# Patient Record
Sex: MALE | Race: BLACK OR AFRICAN AMERICAN | URBAN - METROPOLITAN AREA
[De-identification: names, ages, dates, MRNs, and addresses within clinical notes are randomized per-mention and may not be internally consistent; named-entity substitution may affect disease eponyms.]

---

## 2018-01-03 LAB
COLOR STL: NORMAL
CONSISTENCY STL: NORMAL
HEMOCCULT SP1 STL QL: NEGATIVE

## 2018-01-04 ENCOUNTER — HISTORICAL (OUTPATIENT)
Dept: INTERNAL MEDICINE | Facility: CLINIC | Age: 63
End: 2018-01-04

## 2018-03-30 ENCOUNTER — HISTORICAL (OUTPATIENT)
Dept: INTERNAL MEDICINE | Facility: CLINIC | Age: 63
End: 2018-03-30

## 2018-03-30 LAB
COLOR STL: NORMAL
CONSISTENCY STL: NORMAL
HEMOCCULT SP2 STL QL: NEGATIVE

## 2018-04-01 ENCOUNTER — HOSPITAL ENCOUNTER (OUTPATIENT)
Dept: INTENSIVE CARE | Facility: HOSPITAL | Age: 63
End: 2018-04-02
Attending: INTERNAL MEDICINE | Admitting: SURGERY

## 2018-04-01 LAB
ABS NEUT (OLG): 1.96 X10(3)/MCL (ref 2.1–9.2)
ALBUMIN SERPL-MCNC: 3.4 GM/DL (ref 3.4–5)
ALBUMIN/GLOB SERPL: 1 RATIO (ref 1–2)
ALP SERPL-CCNC: 82 UNIT/L (ref 45–117)
ALT SERPL-CCNC: 53 UNIT/L (ref 12–78)
AST SERPL-CCNC: 38 UNIT/L (ref 15–37)
BASOPHILS NFR BLD MANUAL: 2 %
BILIRUB SERPL-MCNC: 0.3 MG/DL (ref 0.2–1)
BILIRUBIN DIRECT+TOT PNL SERPL-MCNC: 0.1 MG/DL
BILIRUBIN DIRECT+TOT PNL SERPL-MCNC: 0.2 MG/DL
BUN SERPL-MCNC: 14 MG/DL (ref 7–18)
BUN SERPL-MCNC: 19 MG/DL (ref 7–18)
CALCIUM SERPL-MCNC: 8.7 MG/DL (ref 8.5–10.1)
CALCIUM SERPL-MCNC: 8.9 MG/DL (ref 8.5–10.1)
CHLORIDE SERPL-SCNC: 101 MMOL/L (ref 98–107)
CHLORIDE SERPL-SCNC: 103 MMOL/L (ref 98–107)
CO2 SERPL-SCNC: 27 MMOL/L (ref 21–32)
CO2 SERPL-SCNC: 28 MMOL/L (ref 21–32)
CREAT SERPL-MCNC: 1 MG/DL (ref 0.6–1.3)
CREAT SERPL-MCNC: 1.1 MG/DL (ref 0.6–1.3)
CREAT/UREA NIT SERPL: 13
EOSINOPHIL NFR BLD MANUAL: 3 %
ERYTHROCYTE [DISTWIDTH] IN BLOOD BY AUTOMATED COUNT: 12.2 % (ref 11.5–14.5)
EST. AVERAGE GLUCOSE BLD GHB EST-MCNC: 100 MG/DL
GLOBULIN SER-MCNC: 4.7 GM/ML (ref 2.3–3.5)
GLUCOSE SERPL-MCNC: 107 MG/DL (ref 74–106)
GLUCOSE SERPL-MCNC: 113 MG/DL (ref 74–106)
GRANULOCYTES NFR BLD MANUAL: 40 % (ref 43–75)
HBA1C MFR BLD: 5.1 % (ref 4.2–6.3)
HCT VFR BLD AUTO: 40.8 % (ref 40–51)
HGB BLD-MCNC: 13 GM/DL (ref 13.5–17.5)
LYMPHOCYTES NFR BLD MANUAL: 1 %
LYMPHOCYTES NFR BLD MANUAL: 43 % (ref 20.5–51.1)
MCH RBC QN AUTO: 28.4 PG (ref 26–34)
MCHC RBC AUTO-ENTMCNC: 31.9 GM/DL (ref 31–37)
MCV RBC AUTO: 89.3 FL (ref 80–100)
MONOCYTES NFR BLD MANUAL: 9 % (ref 2–9)
NEUTS BAND NFR BLD MANUAL: 2 % (ref 0–10)
PLATELET # BLD AUTO: 280 X10(3)/MCL (ref 130–400)
PLATELET # BLD EST: ADEQUATE 10*3/UL
PMV BLD AUTO: 9.6 FL (ref 7.4–10.4)
POC TROPONIN: 0.01 NG/ML (ref 0–0.08)
POTASSIUM SERPL-SCNC: 3.9 MMOL/L (ref 3.5–5.1)
POTASSIUM SERPL-SCNC: 4 MMOL/L (ref 3.5–5.1)
PROT SERPL-MCNC: 8.1 GM/DL (ref 6.4–8.2)
RBC # BLD AUTO: 4.57 X10(6)/MCL (ref 4.5–5.9)
RBC MORPH BLD: NORMAL
SODIUM SERPL-SCNC: 138 MMOL/L (ref 136–145)
SODIUM SERPL-SCNC: 138 MMOL/L (ref 136–145)
T4 FREE SERPL-MCNC: 0.91 NG/DL (ref 0.76–1.46)
TROPONIN I SERPL-MCNC: <0.015 NG/ML (ref 0–0.05)
TSH SERPL-ACNC: 1.48 MIU/L (ref 0.36–3.74)
WBC # SPEC AUTO: 5.9 X10(3)/MCL (ref 4.5–11)

## 2018-04-02 LAB
AMPHET UR QL SCN: NEGATIVE
APPEARANCE, UA: CLEAR
BACTERIA #/AREA URNS AUTO: ABNORMAL /[HPF]
BARBITURATE SCN PRESENT UR: NEGATIVE
BENZODIAZ UR QL SCN: NEGATIVE
BILIRUB UR QL STRIP: NEGATIVE
CANNABINOIDS UR QL SCN: NEGATIVE
CHOLEST SERPL-MCNC: 194 MG/DL
CHOLEST/HDLC SERPL: 4.5 {RATIO} (ref 0–5)
COCAINE UR QL SCN: NEGATIVE
COLOR UR: COLORLESS
GLUCOSE (UA): NORMAL
HDLC SERPL-MCNC: 43 MG/DL
HGB UR QL STRIP: NEGATIVE
HYALINE CASTS #/AREA URNS LPF: ABNORMAL /[LPF]
KETONES UR QL STRIP: NEGATIVE
LDLC SERPL CALC-MCNC: 119 MG/DL (ref 0–130)
LEUKOCYTE ESTERASE UR QL STRIP: NEGATIVE
NITRITE UR QL STRIP: NEGATIVE
OPIATES UR QL SCN: NEGATIVE
PCP UR QL: NEGATIVE
PH UR STRIP.AUTO: 6.5 [PH] (ref 5–8)
PH UR STRIP: 6.5 [PH] (ref 4.5–8)
PROT UR QL STRIP: NEGATIVE
RBC #/AREA URNS AUTO: ABNORMAL /[HPF]
SP GR UR STRIP: 1 (ref 1–1.03)
SQUAMOUS #/AREA URNS LPF: <1 /[LPF]
TEMPERATURE, URINE (OHS): 24 DEGC (ref 20–25)
TRIGL SERPL-MCNC: 158 MG/DL
UROBILINOGEN UR STRIP-ACNC: NORMAL
VLDLC SERPL CALC-MCNC: 32 MG/DL
WBC #/AREA URNS AUTO: ABNORMAL /HPF

## 2018-06-11 ENCOUNTER — HISTORICAL (OUTPATIENT)
Dept: INTERNAL MEDICINE | Facility: CLINIC | Age: 63
End: 2018-06-11

## 2018-06-11 LAB
ABS NEUT (OLG): 2.38 X10(3)/MCL (ref 2.1–9.2)
ALBUMIN SERPL-MCNC: 3.8 GM/DL (ref 3.4–5)
ALBUMIN/GLOB SERPL: 1 RATIO (ref 1–2)
ALP SERPL-CCNC: 64 UNIT/L (ref 45–117)
ALT SERPL-CCNC: 55 UNIT/L (ref 12–78)
APPEARANCE, UA: CLEAR
AST SERPL-CCNC: 37 UNIT/L (ref 15–37)
BACTERIA #/AREA URNS AUTO: ABNORMAL /[HPF]
BASOPHILS # BLD AUTO: 0.1 X10(3)/MCL
BASOPHILS NFR BLD AUTO: 2 %
BILIRUB SERPL-MCNC: 0.6 MG/DL (ref 0.2–1)
BILIRUB UR QL STRIP: NEGATIVE
BILIRUBIN DIRECT+TOT PNL SERPL-MCNC: 0.3 MG/DL
BILIRUBIN DIRECT+TOT PNL SERPL-MCNC: 0.3 MG/DL
BUN SERPL-MCNC: 22 MG/DL (ref 7–18)
CALCIUM SERPL-MCNC: 8.5 MG/DL (ref 8.5–10.1)
CHLORIDE SERPL-SCNC: 103 MMOL/L (ref 98–107)
CHOLEST SERPL-MCNC: 198 MG/DL
CHOLEST/HDLC SERPL: 4.4 {RATIO} (ref 0–5)
CO2 SERPL-SCNC: 28 MMOL/L (ref 21–32)
COLOR UR: YELLOW
CREAT SERPL-MCNC: 1.2 MG/DL (ref 0.6–1.3)
EOSINOPHIL # BLD AUTO: 0.13 X10(3)/MCL
EOSINOPHIL NFR BLD AUTO: 2 %
ERYTHROCYTE [DISTWIDTH] IN BLOOD BY AUTOMATED COUNT: 12.5 % (ref 11.5–14.5)
GLOBULIN SER-MCNC: 4.6 GM/ML (ref 2.3–3.5)
GLUCOSE (UA): NORMAL
GLUCOSE SERPL-MCNC: 91 MG/DL (ref 74–106)
HCT VFR BLD AUTO: 38.5 % (ref 40–51)
HDLC SERPL-MCNC: 45 MG/DL
HGB BLD-MCNC: 12.4 GM/DL (ref 13.5–17.5)
HGB UR QL STRIP: NEGATIVE
HYALINE CASTS #/AREA URNS LPF: ABNORMAL /[LPF]
IMM GRANULOCYTES # BLD AUTO: 0.01 10*3/UL
IMM GRANULOCYTES NFR BLD AUTO: 0 %
KETONES UR QL STRIP: NEGATIVE
LDLC SERPL CALC-MCNC: 105 MG/DL (ref 0–130)
LEUKOCYTE ESTERASE UR QL STRIP: NEGATIVE
LYMPHOCYTES # BLD AUTO: 2.48 X10(3)/MCL
LYMPHOCYTES NFR BLD AUTO: 44 % (ref 13–40)
MCH RBC QN AUTO: 28.8 PG (ref 26–34)
MCHC RBC AUTO-ENTMCNC: 32.2 GM/DL (ref 31–37)
MCV RBC AUTO: 89.3 FL (ref 80–100)
MONOCYTES # BLD AUTO: 0.58 X10(3)/MCL
MONOCYTES NFR BLD AUTO: 10 % (ref 4–12)
NEUTROPHILS # BLD AUTO: 2.38 X10(3)/MCL
NEUTROPHILS NFR BLD AUTO: 42 X10(3)/MCL
NITRITE UR QL STRIP: NEGATIVE
PH UR STRIP: 5.5 [PH] (ref 4.5–8)
PLATELET # BLD AUTO: 313 X10(3)/MCL (ref 130–400)
PMV BLD AUTO: 9.4 FL (ref 7.4–10.4)
POTASSIUM SERPL-SCNC: 3.8 MMOL/L (ref 3.5–5.1)
PROT SERPL-MCNC: 8.4 GM/DL (ref 6.4–8.2)
PROT UR QL STRIP: NEGATIVE
RBC # BLD AUTO: 4.31 X10(6)/MCL (ref 4.5–5.9)
RBC #/AREA URNS AUTO: ABNORMAL /[HPF]
SODIUM SERPL-SCNC: 139 MMOL/L (ref 136–145)
SP GR UR STRIP: 1.01 (ref 1–1.03)
SQUAMOUS #/AREA URNS LPF: ABNORMAL /[LPF]
TRIGL SERPL-MCNC: 238 MG/DL
UROBILINOGEN UR STRIP-ACNC: 4 MG/DL
VLDLC SERPL CALC-MCNC: 48 MG/DL
WBC # SPEC AUTO: 5.7 X10(3)/MCL (ref 4.5–11)
WBC #/AREA URNS AUTO: ABNORMAL /HPF

## 2019-12-20 ENCOUNTER — HISTORICAL (OUTPATIENT)
Dept: INTERNAL MEDICINE | Facility: CLINIC | Age: 64
End: 2019-12-20

## 2020-07-28 ENCOUNTER — HISTORICAL (OUTPATIENT)
Dept: ADMINISTRATIVE | Facility: HOSPITAL | Age: 65
End: 2020-07-28

## 2020-07-28 LAB
ABS NEUT (OLG): 1.97 X10(3)/MCL (ref 2.1–9.2)
ALBUMIN SERPL-MCNC: 3.7 GM/DL (ref 3.4–5)
ALBUMIN/GLOB SERPL: 0.8 RATIO (ref 1.1–2)
ALP SERPL-CCNC: 70 UNIT/L (ref 45–117)
ALT SERPL-CCNC: 74 UNIT/L (ref 12–78)
AST SERPL-CCNC: 51 UNIT/L (ref 15–37)
BASOPHILS # BLD AUTO: 0.1 X10(3)/MCL (ref 0–0.2)
BASOPHILS NFR BLD AUTO: 2 %
BILIRUB SERPL-MCNC: 0.6 MG/DL (ref 0.2–1)
BILIRUBIN DIRECT+TOT PNL SERPL-MCNC: 0.3 MG/DL
BILIRUBIN DIRECT+TOT PNL SERPL-MCNC: 0.3 MG/DL (ref 0–0.2)
BUN SERPL-MCNC: 24 MG/DL (ref 7–18)
CALCIUM SERPL-MCNC: 9.2 MG/DL (ref 8.5–10.1)
CHLORIDE SERPL-SCNC: 107 MMOL/L (ref 98–107)
CO2 SERPL-SCNC: 28 MMOL/L (ref 21–32)
CREAT SERPL-MCNC: 1.1 MG/DL (ref 0.6–1.3)
EOSINOPHIL # BLD AUTO: 0.1 X10(3)/MCL (ref 0–0.9)
EOSINOPHIL NFR BLD AUTO: 2 %
ERYTHROCYTE [DISTWIDTH] IN BLOOD BY AUTOMATED COUNT: 12.4 % (ref 11.5–14.5)
EST. AVERAGE GLUCOSE BLD GHB EST-MCNC: 111 MG/DL
GLOBULIN SER-MCNC: 4.9 GM/ML (ref 2.3–3.5)
GLUCOSE SERPL-MCNC: 90 MG/DL (ref 74–106)
HBA1C MFR BLD: 5.5 % (ref 4.2–6.3)
HCT VFR BLD AUTO: 40.6 % (ref 40–51)
HGB BLD-MCNC: 12.7 GM/DL (ref 13.5–17.5)
IMM GRANULOCYTES # BLD AUTO: 0.01 10*3/UL
IMM GRANULOCYTES NFR BLD AUTO: 0 %
LYMPHOCYTES # BLD AUTO: 2.9 X10(3)/MCL (ref 0.6–4.6)
LYMPHOCYTES NFR BLD AUTO: 50 %
MCH RBC QN AUTO: 29 PG (ref 26–34)
MCHC RBC AUTO-ENTMCNC: 31.3 GM/DL (ref 31–37)
MCV RBC AUTO: 92.7 FL (ref 80–100)
MONOCYTES # BLD AUTO: 0.6 X10(3)/MCL (ref 0.1–1.3)
MONOCYTES NFR BLD AUTO: 11 %
NEUTROPHILS # BLD AUTO: 1.97 X10(3)/MCL (ref 2.1–9.2)
NEUTROPHILS NFR BLD AUTO: 34 %
PLATELET # BLD AUTO: 282 X10(3)/MCL (ref 130–400)
PMV BLD AUTO: 9.4 FL (ref 7.4–10.4)
POTASSIUM SERPL-SCNC: 5 MMOL/L (ref 3.5–5.1)
PROT SERPL-MCNC: 8.6 GM/DL (ref 6.4–8.2)
RBC # BLD AUTO: 4.38 X10(6)/MCL (ref 4.5–5.9)
SODIUM SERPL-SCNC: 140 MMOL/L (ref 136–145)
URATE SERPL-MCNC: 9.3 MG/DL (ref 3.5–7.2)
WBC # SPEC AUTO: 5.7 X10(3)/MCL (ref 4.5–11)

## 2021-03-10 ENCOUNTER — HISTORICAL (OUTPATIENT)
Dept: ADMINISTRATIVE | Facility: HOSPITAL | Age: 66
End: 2021-03-10

## 2021-04-26 ENCOUNTER — HISTORICAL (OUTPATIENT)
Dept: RESPIRATORY THERAPY | Facility: HOSPITAL | Age: 66
End: 2021-04-26

## 2021-08-23 ENCOUNTER — HISTORICAL (OUTPATIENT)
Dept: ADMINISTRATIVE | Facility: HOSPITAL | Age: 66
End: 2021-08-23

## 2021-08-23 LAB
FERRITIN SERPL-MCNC: 273.82 NG/ML (ref 21.81–274.66)
HAPTOGLOB SERPL-MCNC: 334 MG/DL (ref 40–368)
IRON SATN MFR SERPL: 16 % (ref 20–50)
IRON SERPL-MCNC: 42 UG/DL (ref 65–175)
LDH SERPL-CCNC: 435 UNIT/L (ref 140–271)
RET# (OHS): 0.11 X10(6)/MCL (ref 0.02–0.09)
RETICULOCYTE COUNT AUTOMATED (OLG): 2.6 % (ref 0.5–1.5)
TIBC SERPL-MCNC: 223 UG/DL (ref 69–240)
TIBC SERPL-MCNC: 265 UG/DL (ref 250–450)
TRANSFERRIN SERPL-MCNC: 237 MG/DL (ref 163–344)
VIT B12 SERPL-MCNC: 573 PG/ML (ref 213–816)

## 2021-09-13 ENCOUNTER — HISTORICAL (OUTPATIENT)
Dept: RADIOLOGY | Facility: HOSPITAL | Age: 66
End: 2021-09-13

## 2021-09-17 ENCOUNTER — HISTORICAL (OUTPATIENT)
Dept: ADMINISTRATIVE | Facility: HOSPITAL | Age: 66
End: 2021-09-17

## 2021-09-17 LAB
ABS NEUT (OLG): 4.29 X10(3)/MCL (ref 2.1–9.2)
ALBUMIN SERPL-MCNC: 2.8 GM/DL (ref 3.4–4.8)
ALBUMIN/GLOB SERPL: 0.5 RATIO (ref 1.1–2)
ALP SERPL-CCNC: 71 UNIT/L (ref 40–150)
ALT SERPL-CCNC: 19 UNIT/L (ref 0–55)
AST SERPL-CCNC: 19 UNIT/L (ref 5–34)
BASOPHILS # BLD AUTO: 0.1 X10(3)/MCL (ref 0–0.2)
BASOPHILS NFR BLD AUTO: 1 %
BILIRUB SERPL-MCNC: 0.3 MG/DL
BILIRUBIN DIRECT+TOT PNL SERPL-MCNC: 0.1 MG/DL (ref 0–0.8)
BILIRUBIN DIRECT+TOT PNL SERPL-MCNC: 0.2 MG/DL (ref 0–0.5)
BUN SERPL-MCNC: 12.8 MG/DL (ref 8.4–25.7)
CALCIUM SERPL-MCNC: 10.4 MG/DL (ref 8.8–10)
CHLORIDE SERPL-SCNC: 100 MMOL/L (ref 98–107)
CO2 SERPL-SCNC: 26 MMOL/L (ref 23–31)
CREAT SERPL-MCNC: 0.79 MG/DL (ref 0.73–1.18)
EOSINOPHIL # BLD AUTO: 0.3 X10(3)/MCL (ref 0–0.9)
EOSINOPHIL NFR BLD AUTO: 4 %
ERYTHROCYTE [DISTWIDTH] IN BLOOD BY AUTOMATED COUNT: 14.8 % (ref 11.5–14.5)
GLOBULIN SER-MCNC: 5.4 GM/DL (ref 2.4–3.5)
GLUCOSE SERPL-MCNC: 74 MG/DL (ref 82–115)
HCT VFR BLD AUTO: 33.7 % (ref 40–51)
HGB BLD-MCNC: 10.6 GM/DL (ref 13.5–17.5)
IMM GRANULOCYTES # BLD AUTO: 0.02 10*3/UL
IMM GRANULOCYTES NFR BLD AUTO: 0 %
LYMPHOCYTES # BLD AUTO: 1.6 X10(3)/MCL (ref 0.6–4.6)
LYMPHOCYTES NFR BLD AUTO: 23 %
MCH RBC QN AUTO: 26 PG (ref 26–34)
MCHC RBC AUTO-ENTMCNC: 31.5 GM/DL (ref 31–37)
MCV RBC AUTO: 82.8 FL (ref 80–100)
MONOCYTES # BLD AUTO: 0.8 X10(3)/MCL (ref 0.1–1.3)
MONOCYTES NFR BLD AUTO: 11 %
NEUTROPHILS # BLD AUTO: 4.29 X10(3)/MCL (ref 2.1–9.2)
NEUTROPHILS NFR BLD AUTO: 61 %
NRBC BLD AUTO-RTO: 0 % (ref 0–0.2)
PLATELET # BLD AUTO: 493 X10(3)/MCL (ref 130–400)
PMV BLD AUTO: 8.5 FL (ref 7.4–10.4)
POTASSIUM SERPL-SCNC: 3.8 MMOL/L (ref 3.5–5.1)
PROT SERPL-MCNC: 8.2 GM/DL (ref 5.8–7.6)
RBC # BLD AUTO: 4.07 X10(6)/MCL (ref 4.5–5.9)
SODIUM SERPL-SCNC: 135 MMOL/L (ref 136–145)
WBC # SPEC AUTO: 7.1 X10(3)/MCL (ref 4.5–11)

## 2021-09-22 ENCOUNTER — HISTORICAL (OUTPATIENT)
Dept: ENDOSCOPY | Facility: HOSPITAL | Age: 66
End: 2021-09-22

## 2021-09-22 LAB — SARS-COV-2 AG RESP QL IA.RAPID: NEGATIVE

## 2021-09-27 ENCOUNTER — HISTORICAL (OUTPATIENT)
Dept: INFUSION THERAPY | Facility: HOSPITAL | Age: 66
End: 2021-09-27

## 2021-09-27 LAB
ABS NEUT (OLG): 4.35 X10(3)/MCL (ref 2.1–9.2)
ALBUMIN SERPL-MCNC: 2.6 GM/DL (ref 3.4–4.8)
ALBUMIN/GLOB SERPL: 0.5 RATIO (ref 1.1–2)
ALP SERPL-CCNC: 69 UNIT/L (ref 40–150)
ALT SERPL-CCNC: 15 UNIT/L (ref 0–55)
AST SERPL-CCNC: 17 UNIT/L (ref 5–34)
BASOPHILS # BLD AUTO: 0.1 X10(3)/MCL (ref 0–0.2)
BASOPHILS NFR BLD AUTO: 1 %
BILIRUB SERPL-MCNC: 0.4 MG/DL
BILIRUBIN DIRECT+TOT PNL SERPL-MCNC: 0.2 MG/DL (ref 0–0.5)
BILIRUBIN DIRECT+TOT PNL SERPL-MCNC: 0.2 MG/DL (ref 0–0.8)
BUN SERPL-MCNC: 14.6 MG/DL (ref 8.4–25.7)
CALCIUM SERPL-MCNC: 9.4 MG/DL (ref 8.8–10)
CHLORIDE SERPL-SCNC: 100 MMOL/L (ref 98–107)
CO2 SERPL-SCNC: 25 MMOL/L (ref 23–31)
CREAT SERPL-MCNC: 0.74 MG/DL (ref 0.73–1.18)
EOSINOPHIL # BLD AUTO: 0.2 X10(3)/MCL (ref 0–0.9)
EOSINOPHIL NFR BLD AUTO: 3 %
ERYTHROCYTE [DISTWIDTH] IN BLOOD BY AUTOMATED COUNT: 14.7 % (ref 11.5–14.5)
GLOBULIN SER-MCNC: 5.4 GM/DL (ref 2.4–3.5)
GLUCOSE SERPL-MCNC: 149 MG/DL (ref 82–115)
HCT VFR BLD AUTO: 34.4 % (ref 40–51)
HGB BLD-MCNC: 10.4 GM/DL (ref 13.5–17.5)
IMM GRANULOCYTES # BLD AUTO: 0.01 10*3/UL
IMM GRANULOCYTES NFR BLD AUTO: 0 %
LYMPHOCYTES # BLD AUTO: 1.1 X10(3)/MCL (ref 0.6–4.6)
LYMPHOCYTES NFR BLD AUTO: 17 %
MAGNESIUM SERPL-MCNC: 1.7 MG/DL (ref 1.6–2.6)
MCH RBC QN AUTO: 25.2 PG (ref 26–34)
MCHC RBC AUTO-ENTMCNC: 30.2 GM/DL (ref 31–37)
MCV RBC AUTO: 83.3 FL (ref 80–100)
MONOCYTES # BLD AUTO: 0.7 X10(3)/MCL (ref 0.1–1.3)
MONOCYTES NFR BLD AUTO: 11 %
NEUTROPHILS # BLD AUTO: 4.35 X10(3)/MCL (ref 2.1–9.2)
NEUTROPHILS NFR BLD AUTO: 67 %
NRBC BLD AUTO-RTO: 0 % (ref 0–0.2)
PLATELET # BLD AUTO: 508 X10(3)/MCL (ref 130–400)
PMV BLD AUTO: 8.3 FL (ref 7.4–10.4)
POTASSIUM SERPL-SCNC: 3.4 MMOL/L (ref 3.5–5.1)
PROT SERPL-MCNC: 8 GM/DL (ref 5.8–7.6)
RBC # BLD AUTO: 4.13 X10(6)/MCL (ref 4.5–5.9)
SODIUM SERPL-SCNC: 136 MMOL/L (ref 136–145)
WBC # SPEC AUTO: 6.5 X10(3)/MCL (ref 4.5–11)

## 2021-09-29 ENCOUNTER — HISTORICAL (OUTPATIENT)
Dept: INFUSION THERAPY | Facility: HOSPITAL | Age: 66
End: 2021-09-29

## 2021-09-30 ENCOUNTER — HISTORICAL (OUTPATIENT)
Dept: RADIOLOGY | Facility: HOSPITAL | Age: 66
End: 2021-09-30

## 2021-10-04 ENCOUNTER — HISTORICAL (OUTPATIENT)
Dept: ADMINISTRATIVE | Facility: HOSPITAL | Age: 66
End: 2021-10-04

## 2021-10-04 LAB — SARS-COV-2 AG RESP QL IA.RAPID: NEGATIVE

## 2021-10-06 ENCOUNTER — HISTORICAL (OUTPATIENT)
Dept: SURGERY | Facility: HOSPITAL | Age: 66
End: 2021-10-06

## 2021-10-11 ENCOUNTER — HISTORICAL (OUTPATIENT)
Dept: INFUSION THERAPY | Facility: HOSPITAL | Age: 66
End: 2021-10-11

## 2021-10-11 LAB
ABS NEUT (OLG): 4.28 X10(3)/MCL (ref 2.1–9.2)
ALBUMIN SERPL-MCNC: 2.6 GM/DL (ref 3.4–4.8)
ALBUMIN/GLOB SERPL: 0.5 RATIO (ref 1.1–2)
ALP SERPL-CCNC: 72 UNIT/L (ref 40–150)
ALT SERPL-CCNC: 24 UNIT/L (ref 0–55)
AST SERPL-CCNC: 28 UNIT/L (ref 5–34)
BASOPHILS # BLD AUTO: 0.1 X10(3)/MCL (ref 0–0.2)
BASOPHILS NFR BLD AUTO: 1 %
BILIRUB SERPL-MCNC: 0.3 MG/DL
BILIRUBIN DIRECT+TOT PNL SERPL-MCNC: 0.1 MG/DL (ref 0–0.8)
BILIRUBIN DIRECT+TOT PNL SERPL-MCNC: 0.2 MG/DL (ref 0–0.5)
BUN SERPL-MCNC: 13.9 MG/DL (ref 8.4–25.7)
CALCIUM SERPL-MCNC: 9.4 MG/DL (ref 8.8–10)
CHLORIDE SERPL-SCNC: 103 MMOL/L (ref 98–107)
CO2 SERPL-SCNC: 26 MMOL/L (ref 23–31)
CREAT SERPL-MCNC: 0.63 MG/DL (ref 0.73–1.18)
EOSINOPHIL # BLD AUTO: 0.2 X10(3)/MCL (ref 0–0.9)
EOSINOPHIL NFR BLD AUTO: 2 %
ERYTHROCYTE [DISTWIDTH] IN BLOOD BY AUTOMATED COUNT: 15.6 % (ref 11.5–14.5)
GLOBULIN SER-MCNC: 5 GM/DL (ref 2.4–3.5)
GLUCOSE SERPL-MCNC: 93 MG/DL (ref 82–115)
HCT VFR BLD AUTO: 32.3 % (ref 40–51)
HGB BLD-MCNC: 10.2 GM/DL (ref 13.5–17.5)
IMM GRANULOCYTES # BLD AUTO: 0.03 10*3/UL
IMM GRANULOCYTES NFR BLD AUTO: 0 %
LYMPHOCYTES # BLD AUTO: 1.5 X10(3)/MCL (ref 0.6–4.6)
LYMPHOCYTES NFR BLD AUTO: 21 %
MAGNESIUM SERPL-MCNC: 1.7 MG/DL (ref 1.6–2.6)
MCH RBC QN AUTO: 25.5 PG (ref 26–34)
MCHC RBC AUTO-ENTMCNC: 31.6 GM/DL (ref 31–37)
MCV RBC AUTO: 80.8 FL (ref 80–100)
MONOCYTES # BLD AUTO: 1.1 X10(3)/MCL (ref 0.1–1.3)
MONOCYTES NFR BLD AUTO: 16 %
NEUTROPHILS # BLD AUTO: 4.28 X10(3)/MCL (ref 2.1–9.2)
NEUTROPHILS NFR BLD AUTO: 59 %
NRBC BLD AUTO-RTO: 0 % (ref 0–0.2)
PLATELET # BLD AUTO: 383 X10(3)/MCL (ref 130–400)
PMV BLD AUTO: 8.4 FL (ref 7.4–10.4)
POTASSIUM SERPL-SCNC: 3.7 MMOL/L (ref 3.5–5.1)
PROT SERPL-MCNC: 7.6 GM/DL (ref 5.8–7.6)
RBC # BLD AUTO: 4 X10(6)/MCL (ref 4.5–5.9)
SODIUM SERPL-SCNC: 137 MMOL/L (ref 136–145)
WBC # SPEC AUTO: 7.2 X10(3)/MCL (ref 4.5–11)

## 2021-10-13 ENCOUNTER — HISTORICAL (OUTPATIENT)
Dept: INFUSION THERAPY | Facility: HOSPITAL | Age: 66
End: 2021-10-13

## 2022-04-11 ENCOUNTER — HISTORICAL (OUTPATIENT)
Dept: ADMINISTRATIVE | Facility: HOSPITAL | Age: 67
End: 2022-04-11

## 2022-04-24 VITALS
SYSTOLIC BLOOD PRESSURE: 98 MMHG | DIASTOLIC BLOOD PRESSURE: 57 MMHG | OXYGEN SATURATION: 96 % | BODY MASS INDEX: 20.97 KG/M2 | HEIGHT: 69 IN | WEIGHT: 141.56 LBS

## 2022-04-30 NOTE — DISCHARGE SUMMARY
Patient:   Chrisitan Echols            MRN: [id number]            FIN: [id number]               Age:   63 years     Sex:  Male     :  1955   Associated Diagnoses:   Uncontrolled hypertension   Author:   Jenn Duong MD      Discharge Information      Discharge Summary Information   Admitted  2018   Discharged  2018   Admitting physician     Roc Velasquez     Referring physician for admission     Jacinto Maradiaga MD.     Discharge diagnosis     Uncontrolled hypertension (GIW02-OF I10).     Discharge medications     OTHER MEDICATIONS (Selected)   Prescriptions  Prescribed  amlodipine 10 mg oral tablet: 10 mg = 1 tab(s), Oral, Daily, # 90 tab(s), 5 Refill(s)  hydrochlorothiazide-lisinopril 25 mg-20 mg oral tablet: 1 tab(s), Oral, Daily, # 90 tab(s), 5 Refill(s)  Documented Medications  Documented  diltiazem 120 mg oral TABlet (immed. release): 120 mg = 1 tab(s), Oral, Daily, 0 Refill(s).        Physical Examination   General:  Alert and oriented, No acute distress.    Eye:  Pupils are equal, round and reactive to light, Extraocular movements are intact, Normal conjunctiva.    HENT:  Normocephalic, Oral mucosa is moist.    Neck:  Supple, Non-tender, No carotid bruit, No jugular venous distention, No lymphadenopathy.    Respiratory:  Lungs are clear to auscultation, Respirations are non-labored, Breath sounds are equal.    Cardiovascular:  Normal rate, Regular rhythm, No murmur, Good pulses equal in all extremities.    Gastrointestinal:  Soft, Non-tender, Non-distended, Normal bowel sounds.       Vital Signs (last 24 hrs)_____  Last Charted___________  Temp Oral     36.9 DegC  ( 04:00)  Heart Rate Peripheral   75 bpm  ( 08:)  Resp Rate         12 br/min  ( 08:)  SBP      132 mmHg  ( 08:)  DBP      83 mmHg  ( 08:)  SpO2      97 %  (:)     Genitourinary:  No costovertebral angle tenderness.    Musculoskeletal:  Normal strength, No deformity.     Integumentary:  Intact, No rash.    Neurologic:  Alert, Oriented, No focal deficits.    Cognition and Speech:  Oriented, Speech clear and coherent.    Psychiatric:  Cooperative, Appropriate mood & affect, Normal judgment.       Hospital Course   Hospital Course   Admitted from: from emergency department.     Length of stay: days  1.     Admit info- 63 year old AAM with PMH of HTN presents to the [facility] ED with complaints of dizziness. He states that he started this afternoon and it lasted for less than a minute. He denies ASHLEY, falling, or blurry vision. He states that he thought it was due to his blood pressure being high.  He states that he measures it when he goes to physical therapy. He denies any other symptoms. Patient is followed by Odalis Tracy NP in the clinic, where he was perscribed HCTZ and Amlodipine but he states that he never took them. He only took his diltiazem.  That is the only medication he took. He denied any other complaints/symptoms.  Patient seemed confused on which medications he was supposed to be on and states that he took them at random times when one would run low he the other one.   Todays info- yesterday evening his BP remained high and was placed on cardene drip. HTN was controlled and was switched to oral medications this morning . BP has been stable in 130s/80s. denies any complaints. pt reports that he takes cardizem 120 mg daily but his BP remained high. he stopped taking norvasc for past several months. advised him to start taking norvasc and also started him on HCTZ- lisinopril 25-20 mg daily. explained it to the patient. He voiced understanding. asdvised him to check his BP every day and maintain a log. follow up with PCP in 1 week. patient discharged in a stable condition.         Discharge Plan   Discharge Summary Plan   Discharge Status: improved.     Discharge instructions given: to patient.     Discharge disposition: discharge to home self care.     Prescriptions: written  and given to patient.     Education and Follow-up   Counseled: patient, family, regarding diagnosis, regarding treatment, regarding medications.     Discharge Planning: Managing Your Hypertension, follow up with PCP in 1 week.

## 2022-04-30 NOTE — ED PROVIDER NOTES
Patient:   Christian Echols            MRN: [id number]            FIN: [id number]               Age:   63 years     Sex:  Male     :  1955   Associated Diagnoses:   Uncontrolled hypertension; Dizziness; T wave inversion in EKG   Author:   Jacinto Maradiaga MD      Basic Information   Time seen: Date 2018, Immediately upon arrival.   History source: Patient.   Arrival mode: Private vehicle.   History limitation: None.   Additional information: Chief Complaint from Nursing Triage Note : Chief Complaint   2018 13:55 CDT       Chief Complaint           dizziness this afternoon.  .      History of Present Illness   The patient presents with dizziness.  The onset was just prior to arrival.  The course/duration of symptoms is improving.  The character of symptoms is lightheaded.  The degree at present is none.  The exacerbating factor is none.  The relieving factor is rest.  Risk factors consist of hypertension.  Prior episodes: none.  Therapy today: see nurses notes.  Associated symptoms: denies chest pain, denies nausea, denies vomiting, denies shortness of breath, denies abdominal pain, denies headache, denies syncope, denies palpitations, denies altered vision, denies altered speech, denies altered coordination, denies focal weakness, denies altered sensation, denies confusion, denies seizure and denies blood in stool.        Review of Systems   Constitutional symptoms:  No fever, no chills, no sweats, no weakness, no fatigue, no decreased activity.    Skin symptoms:  No rash,    Eye symptoms:  No recent vision problems, no blurred vision.    ENMT symptoms:  No ear pain, no sore throat, no nasal congestion.    Respiratory symptoms:  No shortness of breath, no cough, no hemoptysis, no sputum production.    Cardiovascular symptoms:  No chest pain, no palpitations, no tachycardia, no syncope, no diaphoresis, no peripheral edema.    Gastrointestinal symptoms:  No abdominal pain, no nausea, no vomiting,  no diarrhea, no constipation, no rectal bleeding.    Genitourinary symptoms:  No dysuria, no hematuria.    Musculoskeletal symptoms:  No back pain, no Muscle pain, no Joint pain.    Neurologic symptoms:  Dizziness, no headache, no altered level of consciousness, no numbness, no tingling, no weakness.              Additional review of systems information: All systems reviewed as documented in chart.      Health Status   Allergies:    Allergic Reactions (Selected)  No Known Allergies,    Allergies (1) Active Reaction  No Known Allergies None Documented  .   Medications:  (Selected)   Inpatient Medications  Ordered  cloNIDine: 0.1 mg, form: Tab, Oral, Once, first dose 09/28/17 11:58:00 CDT, stop date 09/28/17 11:58:00 CDT, STAT, 24  Prescriptions  Prescribed  Bp pressure cuff: Bp pressure cuff, See Instructions, Use to take BP daily., # 1 EA, 0 Refill(s)  Bp pressure cuff: Bp pressure cuff, See Instructions, Use to take BP daily., # 1 EA, 0 Refill(s), Pharmacy: [facility]  Coricidin HBP Cough & Cold 4 mg-30 mg oral tablet: 1 tab(s), Oral, QID, PRN PRN for cold symptoms, # 16 tab(s), 0 Refill(s)  Nasonex 50 mcg/inh nasal spray: 2 spray(s), Both Nostrils, BID, # 1 bottle(s), 0 Refill(s)  Viagra 100 mg oral tablet: 100 mg = 1 tab(s), Oral, Daily, 1 hour before sexual activity, # 5 tab(s), 1 Refill(s), Pharmacy: [facility] [id number]  amlodipine 10 mg oral tablet: 10 mg = 1 tab(s), Oral, Daily, # 30 tab(s), 1 Refill(s), Pharmacy: [facility] [id number]  hydrochlorothiazide 12.5 mg oral tablet: 12.5 mg = 1 tab(s), Oral, Daily, # 30 tab(s), 3 Refill(s), Pharmacy: [facility] [id number]  Documented Medications  Documented  diltiazem 120 mg oral TABlet (immed. release): 120 mg = 1 tab(s), Oral, Daily, 0 Refill(s).      Past Medical/ Family/ Social History   Medical history:    Active  HTN (401.9)  Resolved  Hemorrhoid (138119367):  Resolved., Reviewed as documented in chart.   Surgical history:    lung  surgery.  Pneumothorax (90254009).  Comments:  2015 16:39 - [facility], [facility]  2014 13:32:43 - Jimmy Combs: R lung, stayed in hospital x 3 weeks, Reviewed as documented in chart.   Family history:    Hypertension.  Mother (Zeny Perkins, )  , Reviewed as documented in chart.   Social history: Reviewed as documented in chart.   Problem list:    Active Problems (5)  Colon cancer screening   ED (erectile dysfunction)   HTN   Hypertension(  Confirmed  )   Well adult exam   .      Physical Examination               Vital Signs   Vital Signs   2018 14:31 CDT       Heart Rate Monitored      72 bpm                             Systolic Blood Pressure   205 mmHg  HI                             Diastolic Blood Pressure  100 mmHg  HI    2018 14:07 CDT       Peripheral Pulse Rate     68 bpm                             Respiratory Rate          14 br/min                             SpO2                      98 %                             Oxygen Therapy            Room air                             Systolic Blood Pressure   192 mmHg  HI                             Diastolic Blood Pressure  106 mmHg  HI                             Mean Arterial Pressure, Cuff              135 mmHg    2018 13:55 CDT       Temperature Oral          36.5 DegC                             Temperature Oral (calculated)             97.70 DegF                             Peripheral Pulse Rate     85 bpm                             Respiratory Rate          14 br/min                             SpO2                      99 %                             Oxygen Therapy            Room air                             Systolic Blood Pressure   206 mmHg  HI                             Diastolic Blood Pressure  96 mmHg  HI  .      Vital Signs (last 24 hrs)_____  Last Charted___________  Temp Oral     36.5 DegC  ( 13:55)  Heart Rate Peripheral   68 bpm  ( 14:07)  Resp Rate         14 br/min   (APR 01 14:07)  SBP      H 205mmHg  (APR 01 14:31)  DBP      H 100mmHg  (APR 01 14:31)  SpO2      98 %  (APR 01 14:07)  .   General:  Alert, no acute distress.    Skin:  Normal for ethnicity.   Head:  Normocephalic.   Neck:  Supple.   Eye:  Normal conjunctiva.   Ears, nose, mouth and throat:  Oral mucosa moist.   Cardiovascular:  Regular rate and rhythm, Normal peripheral perfusion.    Respiratory:  Lungs are clear to auscultation, respirations are non-labored, breath sounds are equal.    Gastrointestinal:  Soft, Nontender, Non distended, Normal bowel sounds.    Neurological:  Alert and oriented to person, place, time, and situation, No focal neurological deficit observed.    Psychiatric:  Cooperative.      Medical Decision Making   Documents reviewed:  Emergency department nurses' notes, emergency department records, prior records.    Electrocardiogram:  Time 4/1/2018 14:06:00, rate 73, normal sinus rhythm, T wave Inversion, II, , III, , AVF, , V5, , V6, Previous EKG available New changes, compared with 9/5/2016 07:47:00.    Results review:  Lab results : Lab View   4/1/2018 14:44 CDT       POC Troponin              0.01 ng/mL    4/1/2018 14:20 CDT       Sodium Lvl                138 mmol/L                             Potassium Lvl             4.0 mmol/L                             Chloride                  103 mmol/L                             CO2                       27 mmol/L                             Calcium Lvl               8.7 mg/dL                             Glucose Lvl               113 mg/dL  HI                             BUN                       14 mg/dL                             Creatinine                1.10 mg/dL                             BUN/Creat Ratio           13  NA                             eGFR-AA                   86 mL/min  LOW                             eGFR-BETTY                  71 mL/min  LOW    .   Radiology results:  Rad Results (ST)  < 12 hrs   Accession:  [id number]  Order: XR Chest 2 Views  Report Dt/Tm: 04/01/2018 14:55  Report:   Clinical History  Chest Pain     Technique  2 views of the chest.     Comparison  January 16, 2018     Findings  Lungs are clear with no visualized focal airspace opacity.  The trachea appears midline.  The cardiomediastinal silhouette is within normal limits.  There is no evidence of pneumothorax or pleural effusion.  Visualized abdomen, soft tissues, and osseous structures are  unremarkable.     Impression  No acute cardiopulmonary process.        .      Reexamination/ Reevaluation   Time: 4/1/2018 17:00:00 .   Vital signs   results included from flowsheet : Vital Signs   4/1/2018 17:16 CDT       Peripheral Pulse Rate     64 bpm                             Heart Rate Monitored      65 bpm                             Respiratory Rate          17 br/min                             SpO2                      96 %                             Oxygen Therapy            Room air                             Systolic Blood Pressure   208 mmHg  HI                             Diastolic Blood Pressure  110 mmHg  HI                             Mean Arterial Pressure, Cuff              143 mmHg    4/1/2018 17:01 CDT       Peripheral Pulse Rate     67 bpm                             Heart Rate Monitored      66 bpm                             Respiratory Rate          20 br/min                             SpO2                      98 %                             Oxygen Therapy            Room air                             Systolic Blood Pressure   211 mmHg  HI                             Diastolic Blood Pressure  104 mmHg  HI                             Mean Arterial Pressure, Cuff              140 mmHg    4/1/2018 16:46 CDT       Peripheral Pulse Rate     61 bpm                             Heart Rate Monitored      64 bpm                             Respiratory Rate          15 br/min                             SpO2                      96 %                              Oxygen Therapy            Room air                             Systolic Blood Pressure   201 mmHg  HI                             Diastolic Blood Pressure  92 mmHg  HI                             Mean Arterial Pressure, Cuff              128 mmHg    4/1/2018 16:30 CDT       Peripheral Pulse Rate     63 bpm                             Heart Rate Monitored      63 bpm                             Respiratory Rate          17 br/min                             SpO2                      95 %                             Oxygen Therapy            Room air                             Systolic Blood Pressure   211 mmHg  HI                             Diastolic Blood Pressure  111 mmHg  HI                             Mean Arterial Pressure, Cuff              144 mmHg    4/1/2018 15:59 CDT       Peripheral Pulse Rate     68 bpm                             Respiratory Rate          18 br/min                             SpO2                      95 %                             Saturation Probe Site     Hand, left                             Oxygen Therapy            Room air                             Systolic Blood Pressure   194 mmHg  HI                             Diastolic Blood Pressure  106 mmHg  HI                             Mean Arterial Pressure, Cuff              135 mmHg                             Blood Pressure Location   Left arm    4/1/2018 15:30 CDT       Peripheral Pulse Rate     64 bpm                             Heart Rate Monitored      64 bpm                             Respiratory Rate          14 br/min                             SpO2                      98 %                             Oxygen Therapy            Room air                             Systolic Blood Pressure   185 mmHg  HI                             Diastolic Blood Pressure  98 mmHg  HI                             Mean Arterial Pressure, Cuff              127 mmHg    4/1/2018 15:00 CDT       Heart Rate Monitored       62 bpm                             Respiratory Rate          16 br/min                             SpO2                      97 %                             Oxygen Therapy            Room air                             Systolic Blood Pressure   203 mmHg  HI                             Diastolic Blood Pressure  98 mmHg  HI                             Mean Arterial Pressure, Cuff              133 mmHg    4/1/2018 14:31 CDT       Heart Rate Monitored      72 bpm                             Systolic Blood Pressure   205 mmHg  HI                             Diastolic Blood Pressure  100 mmHg  HI    4/1/2018 14:07 CDT       Peripheral Pulse Rate     68 bpm                             Respiratory Rate          14 br/min                             SpO2                      98 %                             Oxygen Therapy            Room air                             Systolic Blood Pressure   192 mmHg  HI                             Diastolic Blood Pressure  106 mmHg  HI                             Mean Arterial Pressure, Cuff              135 mmHg    4/1/2018 13:55 CDT       Temperature Oral          36.5 DegC                             Temperature Oral (calculated)             97.70 DegF                             Peripheral Pulse Rate     85 bpm                             Respiratory Rate          14 br/min                             SpO2                      99 %                             Oxygen Therapy            Room air                             Systolic Blood Pressure   206 mmHg  HI                             Diastolic Blood Pressure  96 mmHg  HI     Course: progressing as expected.   Assessment: Pt with symptom of dizziness due to uncontrolled HTN- pt recevied clonidine 0.1mg, labetolol 10mg, labetolo 5mg with no improvement in BP. consult medicine for admission. .   Time: 4/1/2018 18:08:00 .   Course: unchanged.   Interventions: no admit orders from med team as of yet.- called  to page  medicine again. waiting on medicine call.     1818-Spoke with Dr. Joseph who reports that Dr. Franco is on his way to see patient.  no admit orders from med team as of yet.- called  to page medicine again. waiting on medicine call.  .      Impression and Plan   Diagnosis   Uncontrolled hypertension (BUK27-YN I10)   Dizziness (GPA87-QY R42)   T wave inversion in EKG (VXQ12-YO R94.31)      Calls-Consults   -  4/1/2018 17:17:00 , Internal Medicine, recommends Spoke w torsten Joseph, will come and see patient in ER..    Plan   Condition: Stable.    Disposition: Place in Observation Telemetry Unit,  admit time  4/1/2018 17:38:00    Counseled: Patient, Regarding diagnosis, Regarding diagnostic results, Regarding treatment plan, Patient indicated understanding of instructions.

## 2022-04-30 NOTE — H&P
"   Patient:   Christian Echols            MRN: [id number]            FIN: [id number]               Age:   63 years     Sex:  Male     :  1955   Associated Diagnoses:   None   Author:   Vargas Joseph MD        Chief Complaint:  dizziness    HPI:  63 year old AAM with PMH of HTN presents to the [facility] ED with complaints of dizziness. He states that he started this afternoon and it lasted for less than a minute. He denies ASHLEY, falling, or blurry vision. He states that he thought it was due to his blood pressure being high.  He states that he measures it when he goes to physical therapy. He denies any other symptoms. Patient is followed by Odalis Tracy NP in the clinic, where he was perscribed HCTZ and Amlodipine but he states that he never took them. He only took his diltiazem.  That is the only medication he took. He denied any other complaints/symptoms.  Patient seemed confused on which medications he was supposed to be on and states that he took them at random times when one would run low he the other one.     In ED, SBP was in 200s. HR in 60s. Patient given clonidine 0.1 and labetalol 10 mg plus labetalol 5 mg over 2 hr period, with no improvement of his BP.    Allergies:  NKDA    Past Medical Hx:  HTN    Past Surgical Hx:  Blebectomy s/p VATS for pneumothorax in  - per surgery clinic note    Past Family Hx:  Mother - "heart surgery"    Past Social Hx:  Alcohol Use: Denies  Tobacco Use: former smoker, smoked for 20+ years in the past, stopped at age 43  Illicit Drug Use: denies  Sexual History: has perscrition for Viagra  Occupation:       Review Of Systems:  Constitutional: no fever, no night sweats, chills or fatigue, +dizziness  HEENT: no acute vision changes or photobia, no hearing loss  CVS: no chest pain, palpitations, or orthopnea  Resp: no SOB, cough, or wheezing  GI: no abd pain, nausea, vomiting, or diarrhea  : no dysuria, urinary incontinence  Musculoskeletal: no joint " stiffness, pain, swelling or weakness  Skin: no rashes, lesions  Neuro: no headaches, syncope, seizures, or numbness  Psych: no depression or anxiety      Home meds:  Home Medications (8) Active  amlodipine 10 mg oral tablet 10 mg = 1 tab(s), Oral, Daily  Bp pressure cuff See Instructions  Bp pressure cuff See Instructions  Coricidin HBP Cough & Cold 4 mg-30 mg oral tablet 1 tab(s), PRN, Oral, QID  diltiazem 120 mg oral TABlet (immed. release) 120 mg = 1 tab(s), Oral, Daily  hydrochlorothiazide 12.5 mg oral tablet 12.5 mg = 1 tab(s), Oral, Daily  Nasonex 50 mcg/inh nasal spray 2 spray(s), Both Nostrils, BID  Viagra 100 mg oral tablet 100 mg = 1 tab(s), Oral, Daily        Physical Examination:  Vital Signs (last 24 hrs)_____  Last Charted___________  Temp Oral     36.5 DegC  (APR 01 13:55)  Heart Rate Peripheral   63 bpm  (APR 01 20:00)  Resp Rate         16 br/min  (APR 01 20:00)  SBP      H 193mmHg  (APR 01 20:00)  DBP      H 101mmHg  (APR 01 20:00)  SpO2      95 %  (APR 01 20:00)    General: AAOx3, NAD, alert and cooperative  HEENT: PERRLA, EOMI, normal conjunctiva  Neck: no LAD, no JVD, supple  CVS: S1/S2 nml, RRR, no murmurs, rubs or gallops  Resp: CTA B/L, no rhonchi, rales, or wheezing  GI: not distended, BS+  : no CVA tenderness, normal external genitalia  Skin: not jaundiced, warm, no rashes  Musculoskeletal: normal ROM, no joint tenderness, normal muscular development  Extremities: no peripheral edema, peripheral pulses intact  Neuro: CN II-XII grossly intact, strength and sensation symmetric and intact throughout, no focal neurological deficits    Labs:  Labs Last 24 Hours   Chemistry Hematology/Coagulation   Sodium Lvl: 138 mmol/L (04/01/18 19:57:39) WBC: 5.9 x10(3)/mcL (04/01/18 19:40:32)   Potassium Lvl: 3.9 mmol/L (04/01/18 19:57:39) RBC: 4.57 x10(6)/mcL (04/01/18 19:40:32)   Chloride: 101 mmol/L (04/01/18 19:57:39) Hgb: 13 gm/dL Low (04/01/18 19:40:32)   CO2: 28 mmol/L (04/01/18 19:57:39) Hct: 40.8  % (18 19:40:32)   Calcium Lvl: 8.9 mg/dL (18 19:57:39) Platelet: 280 x10(3)/mcL (18 19:40:32)   Glucose Lvl: 107 mg/dL High (18 19:57:39) MCV: 89.3 fL (18 19:40:32)   EA mg/dL (18 20:02:28) MCH: 28.4 pg (18:40:32)   BUN: 19 mg/dL High (18 19:57:39) MCHC: 31.9 gm/dL (18:40:32)   Creatinine: 1 mg/dL (18 19:57:39) RDW: 12.2 % (18:40:32)   BUN/Creat Ratio: 13 (18 14:47:14) MPV: 9.6 fL (18:40:32)   eGFR-AA: 96 mL/min (18 19:57:41) Abs Neut: 1.96 x10(3)/mcL Low (18 19:40:32)   eGFR-BETTY: 80 mL/min Low (18 19:57:44) Segs Man: 40 % Low (18 20:04:42)   Bili Total: 0.3 mg/dL (18 19:57:39) Band Man: 2 % (18 20:04:42)   Bili Direct: 0.2 mg/dL (18 19:57:39) Lymph Man: 43 % (18 20:04:42)   Bili Indirect: 0.1 mg/dL (18 19:57:39) Monocyte Man: 9 % (18 20:04:42)   AST: 38 unit/L High (18 19:57:39) Eos Man: 3 % (18 20:04:42)   ALT: 53 unit/L (18 19:57:39) Basophil Man: 2 % High (18 20:04:42)   Alk Phos: 82 unit/L (18 19:57:39) Abn Lymph Man: 1 % High (18 20:04:42)   Total Protein: 8.1 gm/dL (18 19:57:39) Platelet Est: Adequate (18 20:04:42)   Albumin Lvl: 3.4 gm/dL (18 19:57:39) RBC Morph: Normal (18 20:04:42)   Globulin: 4.7 gm/mL High (18 19:57:39)    A/G Ratio: 1 ratio (18 19:57:39)    Hgb A1c: 5.1 % (18 20:02:28)    Troponin-I: <0.015 (18 19:57:43)    POC Troponin: 0.01 ng/mL (18 15:10:33)    T4 Free: 0.91 ng/dL (18 19:57:42)    TSH: 1.48 mIU/L (18 19:57:40)        EK18: HR 73, Normal Sinus Rhythm, T wave inverisons on II, III, AVF, V5, V6    Radiology:  Accession: [id number]  Order: XR Chest 2 Views  Report Dt/Tm: 2018 14:55  Report:   Clinical History  Chest Pain     Technique  2 views of the chest.     Comparison  2018     Findings  Lungs are  clear with no visualized focal airspace opacity.  The trachea appears midline.  The cardiomediastinal silhouette is within normal limits.  There is no evidence of pneumothorax or pleural effusion.  Visualized abdomen, soft tissues, and osseous structures are  unremarkable.     Impression  No acute cardiopulmonary process.    Assessment and Plan:  1. Malignant hypertension  2. Dizziness  2. Hx of pneumothorax        Patient started on home medications which he was supposed to be on, amlodipine 10 mg, HCTZ 12.5 mg, continue to monitor patient's blood pressure.  Patient will need outpatient PFTs for smoking history and history of blebectomy.  Chest x-ray showed no cardiopulmonary process.     Prophylaxis: BL SCDs  Disposition: Admitted to telemetry on monitor.  Started amlodipine and HCTZ, continue to monitor blood pressure.  Likely discharge in a.m.

## 2022-05-05 NOTE — HISTORICAL [facility] CERNER
This is a historical note converted from [employer]. Formatting and pictures may have been removed.  Please reference [employer]ner for original formatting and attached multimedia. Indication for Surgery  Need for Mediport to start chemotherapy  Preoperative Diagnosis  Esophageal squamous cell CA  Postoperative Diagnosis  esophageal squamous cell CA  Operation  Mediport insertion  Surgeon(s)  KATHERINE Castle- Staff  Assistant  FADI Monroy- resident  TRICE Sanchez- resident  Anesthesia  MAC + local  see anesthesia record  Estimated Blood Loss  20 mL  Urine Output  0mL  Findings  Significant lymphadenopathy L cervical region  Patent/compressible R internal jugular vein  Specimen(s)  None  Complications  None  Technique  The patient was properly consented and brought into the operating room. He was placed on the table in the supine position and MAC anesthesia was administered. He was prepped and draped in the standard sterile fashion. His right internal jugular vein was identified using ultrasound, and it was found to be patent and compressible. Lidocaine was injected into skin superficial to his RIJ and a 1cm skin incision was made. Using ultrasound guidance, venous access was easily made using a large bore needle. Venous blood was aspirated. The guide wire was threaded through the needle and the needle was removed. Xray confirmed position of the wire in the vena cava. Next a 4-5cm skin incision was made two finger breadths below the distal clavicle. A tunnel was created using bovie electrocautery and blunt dissection. Hemostasis was achieved in the pocket. A skin tunnel was then created between the venipuncture site and the skin pocket, threading a catheter between the two sites. There was some bleeding deep to the tunneled skin at the venipuncture site to which 2 figure of 8 vicryl sutures were placed, resulting in hemostasis. A dilator/introducer sheath was threaded over the guide wire and the guide wire was removed. The catheter was fed  into the introducer sheath and the sheath was removed. The mediport was connected to the catheter. It was aspirated/flushed with heparinized saline x2. Two proline stay sutures were used to secure the mediport to underlying tissue. The skin incision on the chest wall was closed with interrupted vicryl suture in the subcutaneous tissue and running subcuticular?monocryl for the skin. The RIJ venipuncture site was closed with interrupted monocryl sutures. Dermabond was applied to both sites. No dressings applied.  ?  The patient suffered no complications and was awakened from MAC anesthesia and brought to PACU. Dr. Castle was available for the entire operation.  ?  Josefa Sanchez MD  [facility], HO-1  ?  This certifies I was present during the entire period between opening and closing of the surgical field.  ?

## 2022-05-05 NOTE — HISTORICAL [facility] CERNER
This is a historical note converted from Dione. Formatting and pictures may have been removed.  Please reference Dione for original formatting and attached multimedia. Chief Complaint  complaint of productive cough-coughing up bloody sputum/refill on medications  History of Present Illness  Christian Echols is a 66-year-old male past medical history of hypertension, hemorrhoids, GERD, gout,?and erectile?dysfunction who presents to medicine clinic?as a?follow-up. Today, endorses a months long history of hemoptysis. Patient states that about 3 months ago, he had an episode of?hemoptysis?after eating?chicken.? At first, patient states that he has been coughing up blood since this episode?and that he has been coughing up progressively less blood each time.? Later in the visit, patient states that it was only one episode of hemoptysis.? He does note that he has been coughing more frequently at night when he lays down?for sleep.? Endorses compliance with home medications.? Denies any shortness of breath, chest pain, or any joint pains.  Review of Systems  General:?Denies?weight loss, fever, chills, weakness or fatigue.  HEENT:?Denies?visual loss, blurred vision, double vision or yellow sclerae. No hearing loss, sneezing, congestion, runny nose or sore throat. Denies hoarseness, toothache, neck pain, or neck swelling.  Cardiovascular:?Denies?chest pain, chest pressure or chest discomfort. No palpitations or edema.  Respiratory:?Denies shortness of breath, cough, wheezing, or TB exposure.  Gastrointestinal:?Denies?anorexia, dysphagia,?nausea, vomiting, diarrhea, constipation, melena, rectal bleeding, recent change in bowel habits, or abdominal pain.  Genitourinary:?Denies?dysuria, hesitancy, urinary incontinence, nocturia, CVA tenderness, or difficulty urinating.  Musculoskeletal: Denies myalgias, arthralgias, or joint stiffness or swelling. ?  Skin: Denies?any new rashes, bruising, wounds, or other skin  changes.  Neurologic: Denies headache, dizziness, syncope, numbness, tingling, paralysis, seizures, or ataxia.  Physical Exam  Vitals & Measurements  T:?36.8? ?C (Oral)? HR:?85(Peripheral)? RR:?18? BP:?142/78?  WT:?84.700?kg?  General: Well appearing male in NAD. AAO x 3.  HEENT: Normocephalic, atraumatic.  Neck: Supple, non-tender. No lymphadenopathy or thyromegaly.  Cardiovascular: RRR, normal S1, S2. No murmurs, rubs, or extra heart sounds noted.  Respiratory: CTABL. No rales, wheezes, or rhonchi. Upper airway congestion noted.  Abdomen: Non-tender, non-distended. Normoactive bowel sounds. No hepatosplenomegaly.  Extremities: No clubbing, cyanosis, or edema noted. Normal ROM.  Skin: No rashes or lesions noted.  Assessment/Plan  1. ?Hypertension  -Currently on amlodipine 10 mg qd and?HCTZ-lisinopril 25-20 mg qd.  -Previously on diltiazem  -Continue losartan  ?   2.? Erectile dysfunction  -Able to obtain erections?at times without medication  -Patient requesting switch back to sildenafil  ?   3.? Health maintenance  -Patient due for multiple vaccines  -Refused flu vaccine  -Fit test ordered  ?   4.? Hemorrhoids  -Endorses a?months long history of problems with painful bowel movements  -Represcribed Anusol cream  -Educated on?increasing fiber intake and sitz baths  -Non-operative management per [facility] surgery clinic  -Continue Colace  -Naproxen as needed for pain  ?   5. Possible gout  -No acute symptoms since last visit  -Will treat as gout given history  -Colchicine PRN  -Uric acid levels elevated  -Stopping HCTZ and switch lisinopril to losartan.  ?   6. Hand rash  -States that it has been improving recently  -Likely related to contact dermatitis  -Advised to present to [facility] or urgent care if rash acutely worsens  ?   7.? Dyspepsia  -Continue famotidine 20 mg daily  -Patient also taking?PPI and?sucralfate given to him by the ED?recently  ?   8. Hemoptysis  -Patient gives an at times conflicting story  -Will order  chest x-ray and PFTs to evaluate  -Also prescribing guaifenesin for?chest congestion?and sputum production  ?  Return to clinic in?4 months  ?  Kris Cochran MD  IM?PGY-III  Pager [phone]   Problem List/Past Medical History  Ongoing  Colon cancer screening  ED (erectile dysfunction)  Hemorrhoid  HTN  Hypertension(  Confirmed  )  Well adult exam  Historical  Hemorrhoid  Procedure/Surgical History  lung surgery  Pneumothorax   Medications  amlodipine 10 mg oral tablet, 10 mg= 1 tab(s), Oral, Daily, 3 refills  cloNIDine, 0.1 mg= 1 tab(s), Oral, Once  colchicine 0.6 mg oral capsule, 0.6 mg= 1 cap(s), Oral, Daily, 5 refills  losartan 100 mg oral tablet, 100 mg= 1 tab(s), Oral, Daily, 6 refills  Pepcid 20 mg oral tablet, 20 mg= 1 tab(s), Oral, Daily, 5 refills  Tadalafil (Eqv-Cialis) 20 mg oral tablet, 20 mg= 1 tab(s), Oral, Daily, PRN, 1 refills  Ventolin HFA 90 mcg/inh inhalation aerosol, 1 puff(s), INH, q4hr, PRN, 5 refills  Voltaren 1% topical gel, 1 rishi, TOP, QID, PRN  Allergies  No Known Allergies  Social History  Abuse/Neglect  No, 02/09/2021  No, No, Yes, 11/21/2020  Alcohol - Denies Alcohol Use, 10/14/2012  Never, 11/16/2020  Employment/School  Employed, Highest education level: High school., 11/16/2020  Exercise  Exercise duration: 30. Exercise frequency: 1-2 times/week. Exercise type: push ups., 11/16/2020  Home/Environment  Lives with Alone. Living situation: Home/Independent. Single family house, 11/16/2020  Nutrition/Health  Regular, Good, 11/16/2020  Other  Sexual  Sexually active: Yes., 03/01/2015  Spiritual/Cultural  Anabaptist, 11/16/2020  Substance Use - Denies Substance Abuse, 10/14/2012  Never, 11/16/2020  Tobacco - Denies Tobacco Use, 10/14/2012  Never (less than 100 in lifetime), No, 02/09/2021  Never (less than 100 in lifetime), N/A, 11/21/2020  Family History  Hypertension.: Mother.  Immunizations  Vaccine Date Status   pneumococcal 23-polyvalent vaccine 12/17/2019 Given   [facility]  Maintenance  ???Pending?(in the next year)  ??? ??OverDue  ??? ? ? ?Aspirin Therapy for CVD Prevention due??12/04/18??and every 1??year(s)  ??? ? ? ?Hypertension Management-Education due??12/04/18??and every 1??year(s)  ??? ? ? ?Influenza Vaccine due??10/01/20??and every 1??day(s)  ??? ? ? ?Colorectal Screening due??12/17/20??and every 1??year(s)  ??? ? ? ?Obesity Screening due??01/01/21??and every 1??year(s)  ??? ? ? ?Advance Directive due??01/02/21??and every 1??year(s)  ??? ? ? ?Alcohol Misuse Screening due??01/02/21??and every 1??year(s)  ??? ? ? ?Cognitive Screening due??01/02/21??and every 1??year(s)  ??? ? ? ?Fall Risk Assessment due??01/02/21??and every 1??year(s)  ??? ? ? ?Functional Assessment due??01/02/21??and every 1??year(s)  ??? ??Due?  ??? ? ? ?Body Mass Index Check due??02/03/21??and every 1??year(s)  ??? ? ? ?Medicare Annual Wellness Exam due??03/10/21??and every 1??year(s)  ??? ? ? ?Zoster Vaccine due??03/10/21??Unknown Frequency  ??? ??Refused?  ??? ? ? ?Tetanus Vaccine due??03/10/21??and every 10??year(s)  ??? ??Due In Future?  ??? ? ? ?Blood Pressure Screening not due until??07/28/21??and every 1??year(s)  ??? ? ? ?Hypertension Management-Blood Pressure not due until??07/28/21??and every 1??year(s)  ??? ? ? ?Depression Screening not due until??11/16/21??and every 1??year(s)  ??? ? ? ?ADL Screening not due until??11/16/21??and every 1??year(s)  ??? ? ? ?Hypertension Management-BMP not due until??11/21/21??and every 1??year(s)  ???Satisfied?(in the past 1 year)  ??? ??Satisfied?  ??? ? ? ?ADL Screening on??11/16/20.??Satisfied by Rimma Olmos RN  ??? ? ? ?Advance Directive on??07/28/20.??Satisfied by Rimma Olmos RN  ??? ? ? ?Blood Pressure Screening on??02/09/21.??Satisfied by Pattie Higgins RN  ??? ? ? ?Depression Screening on??11/16/20.??Satisfied by Rimma Olmos RN  ??? ? ? ?Diabetes Screening on??11/21/20.??Satisfied by Jesse Vargas  ??? ? ? ?Fall Risk Assessment  on??11/21/20.??Satisfied by Charanjit HERNÁNDEZ, Jason Rhodes  ??? ? ? ?Functional Assessment on??07/28/20.??Satisfied by Nickolas HERNÁNDEZ, Rimma  ??? ? ? ?Hypertension Management-Blood Pressure on??02/09/21.??Satisfied by Pattie Higgins RN  ??? ? ? ?Obesity Screening on??07/26/20.??Satisfied by Francisco HERNÁNDEZ, Mono WHITMAN  ?      Reviewed chart. Discussed assessment and plan with resident, necessary care provided.??Agree with all the above findings.

## 2022-05-05 NOTE — HISTORICAL [facility] CERNER
This is a historical note converted from Dione. Formatting and pictures may have been removed.  Please reference Dione for original formatting and attached multimedia. History of Present Illness  Mr. Echols is a 66 year old AAM with HTN, GERD, newly diagnosed SCCA of unknown primary here for an EGD.  ?  Hospitalized in August 2021 for SOB, cough, weight loss and found to have extensive mediastinal, neck, supraclavicular adenopathy on CT scan which was biopsied and showed SCCA.? He has been seen by Dr. Weathers and referred for EGD to help determine primary origin as PET CT showed hypermetabolic activity surrounding the lower thoracic esophagus.? He denies any dysphagia, heartburn or reflux to me today.? On evaluation today, he seems to have no comprehension of current cancer diagnosis.? His daughter who is present with him today had no idea he has cancer.??There appears to be little?understanding of his current clinical condition from the patient or family.? He is?a prior smoker and alcohol use.? He denies any constipation,?diarrhea or bleeding.? He lost approximately?30 lbs over the course of his illness recently.  ?  ?  Review of Systems  Comprehensive Review of Systems performed with no exceptions other than as noted in HPI.  ?  Physical Exam  Vitals & Measurements  T:?36.8? ?C (Oral)? HR:?100(Monitored)? RR:?20? BP:?144/81? SpO2:?93%? WT:?65.6?kg? BMI:?21.37?  General:?ill appearing, thin, in no acute distress  Eye: clear conjunctiva  HENT:? oropharynx without erythema/exudate, oropharynx and nasal mucosal surfaces dry  Neck:? bulky adenopathy, prominet IJ  Respiratory:?symmetrical chest expansion and respiratory effort, clear to auscultation bilaterally  Cardiovascular:?regular rate and rhythm without murmurs, gallops or rubs  Gastrointestinal:?soft, non-tender, non-distended with normal bowel sounds, without masses to palpation  Integumentary: no rashes or skin lesions present  Neurologic: cranial nerves  intact, no asterixis, awake, alert, and oriented  Psych:?poor insight, appropriate mood, normal affect  ?  Assessment/Plan  Mr. Echols is a 66 year old AAM with HTN, GERD, newly diagnosed SCCA of unknown primary here for an EGD.  ?  ?  Risks, benefits, and alternatives of the procedure discussed.?  Will proceed with endoscopic procedure as scheduled.   Problem List/Past Medical History  Ongoing  Anemia  Colon cancer screening  Dyspepsia  ED (erectile dysfunction)  Gout  Hemorrhoid  HTN  Hypertension(  Confirmed  )  Mass of left side of neck  Metastatic squamous cell carcinoma  Well adult exam  Historical  Hemorrhoid  Procedure/Surgical History  Biopsy Gastrointestinal (09/22/2021)  Esophagogastroduodenoscopy (09/22/2021)  Extraction of Left Neck Lymphatic, Percutaneous Approach, Diagnostic (08/16/2021)  lung surgery  Pneumothorax   Medications  Inpatient  buffered lidocaine 2% - 0.5 ml syringe, 10 mg= 0.5 mL, Subcutaneous, As Directed  buffered lidocaine 2% - 0.5 ml syringe, 10 mg= 0.5 mL, Subcutaneous, As Directed  cloNIDine, 0.1 mg= 1 tab(s), Oral, Once  IVF Lactated Ringers LR Infusion 1,000 mL, 1000 mL, IV  IVF Lactated Ringers LR Infusion 1,000 mL, 1000 mL, IV  Lidocaine Viscous 2% mucous membrane solution, 15 mL/EA, N/A, Once  Home  albuterol CFC free 90 mcg/inh inhalation aerosol with adapter, 2 puff(s), INH, QID, PRN  amlodipine 10 mg oral tablet, 10 mg= 1 tab(s), Oral, Daily, 3 refills  Fleet Glycerin Suppositories Adult rectal suppository, 1 supp, WA (rectal), Daily, PRN, 1 refills  hydrocortisone 25 mg rectal suppository, 25 mg= 1 supp, WA (rectal), BID, 1 refills  hydrocortisone-pramoxine 2.5%-1% rectal cream, 1 rishi, WA (rectal), QID, 7 refills  losartan 100 mg oral tablet, 100 mg= 1 tab(s), Oral, Daily, 6 refills  omeprazole 40 mg oral DR capsule, 40 mg= 1 cap(s), Oral, Daily, 4 refills  [id number] oral powder for reconstitution, 17 gm, Oral, Daily, 6 refills  tadalafil 20 mg oral tablet, 20 mg= 1  tab(s), Oral, Daily, 5 refills  Allergies  No Known Allergies  Social History  Abuse/Neglect  No, No, Yes, 09/22/2021  Alcohol - Denies Alcohol Use, 10/14/2012  Never, 09/17/2021  Employment/School  Employed, Highest education level: High school., 11/16/2020  Exercise  Exercise duration: 30. Exercise frequency: 1-2 times/week. Exercise type: push ups., 11/16/2020  Financial/Legal Situation  None, 09/13/2021  Home/Environment  Lives with Alone. Living situation: Home/Independent. Single family house, 11/16/2020  Nutrition/Health  Regular, Good, 11/16/2020  Other  Sexual  Sexually active: Yes., 03/01/2015  Spiritual/Cultural  Quaker, 11/16/2020  Substance Use - Denies Substance Abuse, 10/14/2012  Never, 09/17/2021  Tobacco - Denies Tobacco Use, 10/14/2012  Former smoker, quit more than 30 days ago, No, 09/22/2021  Family History  Hypertension.: Mother.  Immunizations  Vaccine Date Status Comments   tetanus/diphtheria/pertussis, acel(Tdap) 09/13/2021 Given    pneumococcal 23-polyvalent vaccine 09/13/2021 Given    COVID-19 MRNA, LNP-S, PF- Pfizer 07/19/2021 Recorded 2nd dose given. Patient got the vaccine [facility]   COVID-19 MRNA, LNP-S, PF- Pfizer 06/19/2021 Recorded    COVID-19 MRNA, LNP-S, PF- Pfizer 06/01/2021 Recorded    pneumococcal 23-polyvalent vaccine 12/17/2019 Given
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
## 2022-05-05 NOTE — HISTORICAL [facility] CERNER
This is a historical note converted from Dione. Formatting and pictures may have been removed.  Please reference Dione for original formatting and attached multimedia. Patient seen and examined this morning. No chnages to the H&P below. To OR for mediport placement.  ?  Satnam Mata MD  [facility], PGY-1  ?  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 Patient has a previous hx of pneumothorax and lung surgery as well?what sounded like a tube placement (possible trach?) 20 years ago. The patient?is somewhat of a poor historian.  Review of Systems  Denies nausea, vomiting, fever, chills, chest pain, sob, dizziness, headaches  Physical Exam  ???Vitals & Measurements  ??T:?37.0? ?C (Oral)? HR:?87(Peripheral)? RR:?20? BP:?126/78?  ??HT:?179.00?cm? WT:?66.000?kg? BMI:?20.6?  Gen:?NAD  Neuro:?Alert and able to converse  CV: RR, no murmurs  Pulm: No increased work of breathing on room air  Breath sounds b/l  GI: S, ND, NT  MSK: able to move extremities  Skin: No LE edema, warm and dry  PICC line on left  Assessment/Plan  ?   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  - OR 10/6  - discussed risks and  benefits of procedure with patient. Patient agrees to proceed. I answered his questions.  ?   Above Hx and assessment reviewed and discussed with Resident.  Agree with plan of care.  ?   Yovani Castle MD Problem List/Past Medical History  Ongoing  ??Anemia  ?Colon cancer screening  ?Dyspepsia  ?ED (erectile dysfunction)  ?Gout  ?Hemorrhoid  ?HTN  ?Hypertension(  Confirmed  )  ?Mass of left side of neck  ?Metastatic squamous cell carcinoma  ?Well adult exam  Historical  ??Hemorrhoid  Procedure/Surgical HistoryBiopsy Gastrointestinal (09/22/2021)  Esophagogastroduodenoscopy (09/22/2021)  Esophagogastroduodenoscopy, flexible, transoral; with biopsy, single or multiple (09/22/2021)  Excision of Middle Esophagus, Via Natural or Artificial Opening Endoscopic, Diagnostic (09/22/2021)  Extraction of Left Neck Lymphatic, Percutaneous Approach, Diagnostic (08/16/2021)  lung surgery  Pneumothorax   ?  Medications  ?albuterol CFC free 90 mcg/inh inhalation aerosol with adapter, 2 puff(s), INH, QID, PRN  ?amlodipine 10 mg oral tablet, 10 mg= 1 tab(s), Oral, Daily, 3 refills  ?azithromycin 250 mg oral tablet, Oral  ?benzonatate 200 mg oral capsule, 200 mg= 1 cap(s), Oral, TID  ?cloNIDine, 0.1 mg= 1 tab(s), Oral, Once  ?Fleet Glycerin Suppositories Adult rectal suppository, 1 supp, GA (rectal), Daily, PRN, 1 refills  ?Heparin Flush 100 U/mL - 5 mL, 500 units= 5 mL, IV Push, Once-chemo  ?hydrocortisone 25 mg rectal suppository, 25 mg= 1 supp, GA (rectal), BID, 1 refills  ?hydrocortisone-pramoxine 2.5%-1% rectal cream, 1 rishi, GA (rectal), QID, 7 refills  ?hydrocortisone-pramoxine 2.5%-1% topical cream, GA (rectal), QID  ?hydrOXYzine pamoate 25 mg oral capsule, 25 mg= 1 cap(s), Oral, QID  ?levoFLOXacin 500 mg oral tablet, 500 mg= 1 tab(s), Oral, Daily  ?levofloxacin 750 mg oral tablet, 750 mg= 1 tab(s), Oral, Daily  ?Lidocaine Viscous 2% mucous membrane solution, 15 mL/EA, N/A, Once  ?losartan 100 mg oral tablet, 100 mg= 1  tab(s), Oral, Daily, 6 refills  ?omeprazole 40 mg oral DR capsule, 40 mg= 1 cap(s), Oral, Daily, 4 refills  ?CVH0459 oral powder for reconstitution, 17 gm, Oral, Daily, 6 refills  ?sildenafil 100 mg oral tablet, 100 mg= 1 tab(s), Oral, Daily  ?tadalafil 20 mg oral tablet, 20 mg= 1 tab(s), Oral, Daily, 5 refills  ?Zofran ODT 8 mg oral tablet, disintegrating, 8 mg= 1 tab(s), Oral, TID, 1 refills,? ?Not taking  Allergies  No Known Allergies  Social History  Abuse/Neglect  ?No, 09/29/2021  Alcohol - Denies Alcohol Use, 10/14/2012  ?Past, 1-2 times per week, Alcohol use interferes with work or home: No. Others hurt by drinking: No. Household alcohol concerns: No., 09/27/2021  Employment/School  ?Employed, Highest education level: High school., 11/16/2020  Exercise  ?Exercise duration: 30. Exercise frequency: 1-2 times/week. Exercise type: push ups., 11/16/2020  Financial/Legal Situation  ?None, 09/13/2021  Home/Environment  ?Lives with Alone. Living situation: Home/Independent. Single family house, 11/16/2020    ?Never in , 09/30/2021  Nutrition/Health  ?Regular, Good, 11/16/2020  Other  Sexual  ?Sexually active: Yes., 03/01/2015  Spiritual/Cultural  ?Pentecostal, 11/16/2020  Substance Use - Denies Substance Abuse, 10/14/2012  ?Never, 09/17/2021  Tobacco - Denies Tobacco Use, 10/14/2012  ?Former smoker, quit more than 30 days ago, N/A, 09/30/2021  Family History  ?Hypertension.: Mother.  Sister with cancer  Immunizations  ?Vaccine ?Date ?Status ?Comments   ?tetanus/diphtheria/pertussis, acel(Tdap) 09/13/2021 Given    ?pneumococcal 23-polyvalent vaccine 09/13/2021 Given    ?COVID-19 MRNA, LNP-S, PF- Pfizer 07/19/2021 Recorded 2nd dose given. Patient got the vaccine at the [facility] in [place]   ?COVID-19 MRNA, LNP-S, PF- Pfizer 06/19/2021 Recorded    ?COVID-19 MRNA, LNP-S, PF- Pfizer 06/01/2021 Recorded    ?pneumococcal 23-polyvalent vaccine 12/17/2019 Given    ?  [facility]  Health  Maintenance  ???Pending?(in the next year)  ??? ??OverDue  ??? ? ? ?Aspirin Therapy for CVD Prevention due??12/04/18??and every 1??year(s)  ??? ? ? ?Colorectal Screening due??12/17/20??and every 1??year(s)  ??? ??Due?  ??? ? ? ?Medicare Annual Wellness Exam due??09/30/21??and every 1??year(s)  ??? ? ? ?Zoster Vaccine due??09/30/21??Unknown Frequency  ??? ??Due In Future?  ??? ? ? ?ADL Screening not due until??11/16/21??and every 1??year(s)  ??? ? ? ?Obesity Screening not due until??01/01/22??and every 1??year(s)  ??? ? ? ?Advance Directive not due until??01/02/22??and every 1??year(s)  ??? ? ? ?Alcohol Misuse Screening not due until??01/02/22??and every 1??year(s)  ??? ? ? ?Cognitive Screening not due until??01/02/22??and every 1??year(s)  ??? ? ? ?Fall Risk Assessment not due until??01/02/22??and every 1??year(s)  ??? ? ? ?Functional Assessment not due until??01/02/22??and every 1??year(s)  ??? ? ? ?Hypertension Management-Education not due until??09/13/22??and every 1??year(s)  ??? ? ? ?Depression Screening not due until??09/24/22??and every 1??year(s)  ??? ? ? ?Hypertension Management-BMP not due until??09/27/22??and every 1??year(s)  ???Satisfied?(in the past 1 year)  ??? ??Satisfied?  ??? ? ? ?ADL Screening on??11/16/20.??Satisfied by Nickolas HERNÁNDEZ, [place]  ??? ? ? ?Advance Directive on??09/22/21.??Satisfied by Tricia Cordero  ??? ? ? ?Alcohol Misuse Screening on??09/13/21.??Satisfied by Genny Reyes LPN  ??? ? ? ?Blood Pressure Screening on??09/30/21.??Satisfied by Meg Quintana LPN  ??? ? ? ?Body Mass Index Check on??09/30/21.??Satisfied by Meg Quintana LPN  ??? ? ? ?COPD Maintenance-Spirometry on??04/26/21.??Satisfied by Christy Urias  ??? ? ? ?Cognitive Screening on??09/13/21.??Satisfied by Genny Reyes LPN  ??? ? ? ?Depression Screening on??09/24/21.??Satisfied by Jennifer Paz LPN  ??? ? ? ?Diabetes Screening on??09/27/21.??Satisfied by Michael Junior  W.  ??? ? ? ?Fall Risk Assessment on??09/30/21.??Satisfied by Meg Quintana LPN  ??? ? ? ?Functional Assessment on??09/29/21.??Satisfied by Jitendra HERNÁNDEZ, Gema Milner  ??? ? ? ?Hypertension Management-Blood Pressure on??09/30/21.??Satisfied by Lilian OVALLE, Meg Milner  ??? ? ? ?Influenza Vaccine on??09/30/21.??Satisfied by Meg Quintana LPN  ??? ? ? ?Obesity Screening on??09/30/21.??Satisfied by Meg Quintana LPN  ??? ? ? ?Pneumococcal Vaccine on??09/13/21.??Satisfied by Genny Reyes LPN.  ??? ? ? ?Tetanus Vaccine on??09/13/21.??Satisfied by Genny Reyes LPN.  ??? ??Refused?  ??? ? ? ?Tetanus Vaccine on??09/13/21.??Recorded by Genny Reyes LPN.  ??? ? ? ?Zoster Vaccine on??09/13/21.??Recorded by Genny Reyes LPN  ?